# Patient Record
Sex: MALE | Race: WHITE
[De-identification: names, ages, dates, MRNs, and addresses within clinical notes are randomized per-mention and may not be internally consistent; named-entity substitution may affect disease eponyms.]

---

## 2020-11-09 ENCOUNTER — HOSPITAL ENCOUNTER (EMERGENCY)
Dept: HOSPITAL 11 - JP.ED | Age: 80
LOS: 1 days | Discharge: HOME | End: 2020-11-10
Payer: COMMERCIAL

## 2020-11-09 DIAGNOSIS — Z20.828: ICD-10-CM

## 2020-11-09 DIAGNOSIS — I10: ICD-10-CM

## 2020-11-09 DIAGNOSIS — Z79.899: ICD-10-CM

## 2020-11-09 DIAGNOSIS — U07.1: Primary | ICD-10-CM

## 2020-11-09 PROCEDURE — U0002 COVID-19 LAB TEST NON-CDC: HCPCS

## 2020-11-09 PROCEDURE — 81001 URINALYSIS AUTO W/SCOPE: CPT

## 2020-11-09 PROCEDURE — 93010 ELECTROCARDIOGRAM REPORT: CPT

## 2020-11-09 PROCEDURE — 80053 COMPREHEN METABOLIC PANEL: CPT

## 2020-11-09 PROCEDURE — 99283 EMERGENCY DEPT VISIT LOW MDM: CPT

## 2020-11-09 PROCEDURE — 36415 COLL VENOUS BLD VENIPUNCTURE: CPT

## 2020-11-09 PROCEDURE — 99285 EMERGENCY DEPT VISIT HI MDM: CPT

## 2020-11-09 PROCEDURE — 85025 COMPLETE CBC W/AUTO DIFF WBC: CPT

## 2020-11-09 PROCEDURE — 93005 ELECTROCARDIOGRAM TRACING: CPT

## 2020-11-09 PROCEDURE — 84484 ASSAY OF TROPONIN QUANT: CPT

## 2020-11-10 VITALS — DIASTOLIC BLOOD PRESSURE: 78 MMHG | SYSTOLIC BLOOD PRESSURE: 161 MMHG | HEART RATE: 66 BPM

## 2020-11-10 NOTE — EDM.PDOC
ED HPI GENERAL MEDICAL PROBLEM





- General


Chief Complaint: Chest Pain


Stated Complaint: CHEST PAIN


Time Seen by Provider: 11/10/20 00:35


Source of Information: Reports: Patient


History Limitations: Reports: No Limitations





- History of Present Illness


INITIAL COMMENTS - FREE TEXT/NARRATIVE: 





p had shaking chills tonight and he went to bed and he developed chest pain. The

pain was only in one area and it was sharp. 


Onset: Today, Other ( the chest pain started tonight. )


Duration: Hour(s):


Location: Reports: Chest, Generalized, Other ( pt had chills. )


Associated Symptoms: Reports: Chest Pain, Cough


  ** denies


Pain Score (Numeric/FACES): 0





- Related Data


                                    Allergies











Allergy/AdvReac Type Severity Reaction Status Date / Time


 


No Known Allergies Allergy   Verified 11/10/20 00:08











Home Meds: 


                                    Home Meds





Calcium/Mag/D3/B12/FA/B6/Boron [Folgard OS] 1 each PO DAILY 06/23/14 [History]


Lisinopril 10 mg PO DAILY 05/09/16 [History]


Multivitamin with Minerals [Multiple Vitamin] 1 tab PO DAILY 05/09/16 [History]


Vardenafil HCl [Levitra] 20 mg PO ASDIRECTED PRN 05/09/16 [History]











Past Medical History





- Past Health History


Medical/Surgical History: Denies Medical/Surgical History


HEENT History: Reports: Allergic Rhinitis, Impaired Vision


Cardiovascular History: Reports: Hypertension


Musculoskeletal History: Reports: Back Pain, Chronic


Neurological History: Reports: Concussion





- Infectious Disease History


Infectious Disease History: Reports: Chicken Pox





- Past Surgical History


HEENT Surgical History: Reports: Tonsillectomy


GI Surgical History: Reports: Colonoscopy





Social & Family History





- Tobacco Use


Tobacco Use Status *Q: Never Tobacco User


Second Hand Smoke Exposure: No





- Caffeine Use


Caffeine Use: Reports: Coffee, Soda





- Alcohol Use


Days Per Week of Alcohol Use: 1


Number of Drinks Per Day: 1


Total Drinks Per Week: 1





- Recreational Drug Use


Recreational Drug Use: No





- Living Situation & Occupation


Living situation: Reports: , with Spouse


Occupation: Retired





ED ROS GENERAL





- Review of Systems


Review Of Systems: See Below


Constitutional: Reports: Chills, Other (pt developed a cough about 3 days ago 

and tonight he has shaking chills. )


HEENT: Reports: No Symptoms


Respiratory: Reports: Cough


Cardiovascular: Reports: Chest Pain, Other ( this was in one area nd was 

stabbing. )


Endocrine: Reports: No Symptoms


GI/Abdominal: Reports: No Symptoms


: Reports: No Symptoms


Musculoskeletal: Reports: Other (pt was loading corn yesterday. )


Skin: Reports: No Symptoms





ED EXAM, GENERAL





- Physical Exam


Exam: See Below


Free Text/Narrative:: 





pt arrived and was  having chest pain and  a cough. . 


Exam Limited By: No Limitations


General Appearance: Alert, No Apparent Distress, Anxious, Other ( the pain went 

away before he left home. )


Ears: Normal TMs


Nose: Normal Inspection


Throat/Mouth: Normal Inspection


Head: Atraumatic


Neck: Normal Inspection


Respiratory/Chest: No Respiratory Distress, Other (pt is tender in the left 

chest wall. )


Cardiovascular: Regular Rate, Rhythm


GI/Abdominal: Soft, Non-Tender


 (Male) Exam: Deferred


Rectal (Males) Exam: Deferred


Back Exam: Normal Inspection


Extremities: Normal Inspection


Neurological: Alert, Oriented, Normal Cognition


Psychiatric: Anxious





Course





- Vital Signs


Last Recorded V/S: 


                                Last Vital Signs











Temp  36.4 C   11/10/20 00:00


 


Pulse  66   11/10/20 00:00


 


Resp  16   11/10/20 00:00


 


BP  161/78 H  11/10/20 00:00


 


Pulse Ox  95   11/10/20 00:00














- Orders/Labs/Meds


Orders: 


                               Active Orders 24 hr











 Category Date Time Status


 


 EKG Documentation Completion [RC] ASDIRECTED Care  11/09/20 23:42 Active


 


 CORONAVIRUS COVID-19, [facility] Stat Lab  11/10/20 00:47 Received


 


 EKG 12 Lead [EK] Routine Ther  11/09/20 23:42 Ordered











Labs: 


                                Laboratory Tests











  11/10/20 11/10/20 11/10/20 Range/Units





  00:47 00:47 00:47 


 


WBC   5.4   (4.5-11.0)  K/uL


 


RBC   4.67   (4.30-5.90)  M/uL


 


Hgb   13.8  D   (12.0-15.0)  g/dL


 


Hct   41.9   (40.0-54.0)  %


 


MCV   90   (80-98)  fL


 


MCH   30   (27-31)  pg


 


MCHC   33   (32-36)  %


 


Plt Count   171   (150-400)  K/uL


 


Neut % (Auto)   47   (36-66)  %


 


Lymph % (Auto)   26   (24-44)  %


 


Mono % (Auto)   21 H   (2-6)  %


 


Eos % (Auto)   5 H   (2-4)  %


 


Baso % (Auto)   1   (0-1)  %


 


Sodium    139 L  (140-148)  mmol/L


 


Potassium    4.5  (3.6-5.2)  mmol/L


 


Chloride    104  (100-108)  mmol/L


 


Carbon Dioxide    26  (21-32)  mmol/L


 


Anion Gap    13.5  (5.0-14.0)  mmol/L


 


BUN    27 H D  (7-18)  mg/dL


 


Creatinine    1.5 H  (0.8-1.3)  mg/dL


 


Est Cr Clr Drug Dosing    35.44  mL/min


 


Estimated GFR (MDRD)    45 L  (>60)  


 


Glucose    101  ()  mg/dL


 


Calcium    8.3 L  (8.5-10.1)  mg/dL


 


Total Bilirubin    0.3  (0.2-1.0)  mg/dL


 


AST    47 H  (15-37)  U/L


 


ALT    73  (12-78)  U/L


 


Alkaline Phosphatase    39 L  ()  U/L


 


Troponin I  < 0.017    (0.000-0.056)  ng/mL


 


Total Protein    6.6  (6.4-8.2)  g/dL


 


Albumin    3.3 L  (3.4-5.0)  g/dL


 


Globulin    3.3  (2.3-3.5)  g/dL


 


Albumin/Globulin Ratio    1.0 L  (1.2-2.2)  


 


Urine Color     (YELLOW)  


 


Urine Appearance     (CLEAR)  


 


Urine pH     (5.0-8.0)  


 


Ur Specific Gravity     (1.008-1.030)  


 


Urine Protein     (NEGATIVE)  mg/dL


 


Urine Glucose (UA)     (NEGATIVE)  mg/dL


 


Urine Ketones     (NEGATIVE)  mg/dL


 


Urine Occult Blood     (NEGATIVE)  


 


Urine Nitrite     (NEGATIVE)  


 


Urine Bilirubin     (NEGATIVE)  


 


Urine Urobilinogen     (0.2-1.0)  EU/dL


 


Ur Leukocyte Esterase     (NEGATIVE)  


 


Urine RBC     (0-5)  


 


Urine WBC     (0-5)  


 


Ur Epithelial Cells     


 


Amorphous Sediment     


 


Urine Bacteria     


 


Urine Mucus     














  11/10/20 Range/Units





  01:12 


 


WBC   (4.5-11.0)  K/uL


 


RBC   (4.30-5.90)  M/uL


 


Hgb   (12.0-15.0)  g/dL


 


Hct   (40.0-54.0)  %


 


MCV   (80-98)  fL


 


MCH   (27-31)  pg


 


MCHC   (32-36)  %


 


Plt Count   (150-400)  K/uL


 


Neut % (Auto)   (36-66)  %


 


Lymph % (Auto)   (24-44)  %


 


Mono % (Auto)   (2-6)  %


 


Eos % (Auto)   (2-4)  %


 


Baso % (Auto)   (0-1)  %


 


Sodium   (140-148)  mmol/L


 


Potassium   (3.6-5.2)  mmol/L


 


Chloride   (100-108)  mmol/L


 


Carbon Dioxide   (21-32)  mmol/L


 


Anion Gap   (5.0-14.0)  mmol/L


 


BUN   (7-18)  mg/dL


 


Creatinine   (0.8-1.3)  mg/dL


 


Est Cr Clr Drug Dosing   mL/min


 


Estimated GFR (MDRD)   (>60)  


 


Glucose   ()  mg/dL


 


Calcium   (8.5-10.1)  mg/dL


 


Total Bilirubin   (0.2-1.0)  mg/dL


 


AST   (15-37)  U/L


 


ALT   (12-78)  U/L


 


Alkaline Phosphatase   ()  U/L


 


Troponin I   (0.000-0.056)  ng/mL


 


Total Protein   (6.4-8.2)  g/dL


 


Albumin   (3.4-5.0)  g/dL


 


Globulin   (2.3-3.5)  g/dL


 


Albumin/Globulin Ratio   (1.2-2.2)  


 


Urine Color  Yellow  (YELLOW)  


 


Urine Appearance  Clear  (CLEAR)  


 


Urine pH  5.5  (5.0-8.0)  


 


Ur Specific Gravity  1.025  (1.008-1.030)  


 


Urine Protein  Negative  (NEGATIVE)  mg/dL


 


Urine Glucose (UA)  Negative  (NEGATIVE)  mg/dL


 


Urine Ketones  Negative  (NEGATIVE)  mg/dL


 


Urine Occult Blood  Negative  (NEGATIVE)  


 


Urine Nitrite  Negative  (NEGATIVE)  


 


Urine Bilirubin  Negative  (NEGATIVE)  


 


Urine Urobilinogen  0.2  (0.2-1.0)  EU/dL


 


Ur Leukocyte Esterase  Negative  (NEGATIVE)  


 


Urine RBC  0-5  (0-5)  


 


Urine WBC  0-5  (0-5)  


 


Ur Epithelial Cells  Rare  


 


Amorphous Sediment  Not seen  


 


Urine Bacteria  Few  


 


Urine Mucus  Not seen  














- Re-Assessments/Exams


Free Text/Narrative Re-Assessment/Exam: 





11/10/20 01:51


PT HAD A NORMAL EKG NEG TROP. hIS WBC IS LOW. hIS CHEMS LOOK GOOD. a COVID TEST 

WAS DONE AND WILL BE BACK IN 48 HOURS. 





Departure





- Departure


Time of Disposition: 01:52


Disposition: Home, Self-Care 01


Condition: Fair


Clinical Impression: 


 Viral illness, Chest wall pain





Referrals: 


Pantera Coy MD [Primary Care Provider] - 


Forms:  ED Department Discharge


Care Plan Goals: 


CALL FOR THE COVID RESULTS, TYLENOL AND MOTRIN FOR BODY ACHES, PUSH FLUIDS. 





Sepsis Event Note (ED)





- Evaluation


Sepsis Screening Result: No Definite Risk





- Focused Exam


Vital Signs: 


                                   Vital Signs











  Temp Pulse Resp BP Pulse Ox


 


 11/10/20 00:00  36.4 C  66  16  161/78 H  95














- My Orders


Last 24 Hours: 


My Active Orders





11/09/20 23:42


EKG Documentation Completion [RC] ASDIRECTED 


EKG 12 Lead [EK] Routine 





11/10/20 00:47


CORONAVIRUS COVID-19, [facility] Stat 














- Assessment/Plan


Last 24 Hours: 


My Active Orders





11/09/20 23:42


EKG Documentation Completion [RC] ASDIRECTED 


EKG 12 Lead [EK] Routine 





11/10/20 00:47


CORONAVIRUS COVID-19, [facility] Stat